# Patient Record
Sex: FEMALE | Race: WHITE
[De-identification: names, ages, dates, MRNs, and addresses within clinical notes are randomized per-mention and may not be internally consistent; named-entity substitution may affect disease eponyms.]

---
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
## 2023-01-31 ENCOUNTER — NURSE TRIAGE (OUTPATIENT)
Dept: OTHER | Facility: CLINIC | Age: 18
End: 2023-01-31

## 2023-01-31 NOTE — TELEPHONE ENCOUNTER
Location of patient: [place]    Subjective: Caller states \"She has a sore throat, cough, fever, congestion. \"     Onset:  1/30/23 and worsened overnight into 1/31/23    Pain Severity:  severe sore throat    Temperature:  she knows she is febrile, but didn't take temp    What has been tried: ibuprofen    Recommended disposition: See HCP within 4 Hours (or PCP triage)    Care advice provided, patient verbalizes understanding; denies any other questions or concerns; instructed to call back for any new or worsening symptoms. 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 Mom called [facility] line. Pt may go to on-campus clinic. Triage RN recommended to [facility]) to call [employer] to learn about covered UCCs in town. Attention Provider: Thank you for allowing me to participate in the care of your patient. The patient was connected to triage in response to symptoms provided. Please do not respond through this encounter as the response is not directed to a shared pool.     Reason for Disposition   SEVERE (e.g., excruciating) throat pain    Protocols used: Sore Throat-ADULT-

## 2024-01-06 ENCOUNTER — HOSPITAL ENCOUNTER (EMERGENCY)
Age: 19
Discharge: HOME | End: 2024-01-06
Payer: COMMERCIAL

## 2024-01-06 VITALS
DIASTOLIC BLOOD PRESSURE: 83 MMHG | SYSTOLIC BLOOD PRESSURE: 110 MMHG | OXYGEN SATURATION: 98 % | RESPIRATION RATE: 18 BRPM | HEART RATE: 98 BPM | WEIGHT: 153 LBS | TEMPERATURE: 98.2 F

## 2024-01-06 DIAGNOSIS — R11.2 NAUSEA AND VOMITING, UNSPECIFIED VOMITING TYPE: Primary | ICD-10-CM

## 2024-01-06 DIAGNOSIS — R19.7 DIARRHEA, UNSPECIFIED TYPE: ICD-10-CM

## 2024-01-06 PROCEDURE — 99211 OFF/OP EST MAY X REQ PHY/QHP: CPT

## 2024-01-06 RX ORDER — ONDANSETRON 4 MG/1
4 TABLET, FILM COATED ORAL EVERY 8 HOURS PRN
Qty: 10 TABLET | Refills: 0 | Status: SHIPPED | OUTPATIENT
Start: 2024-01-06

## 2024-01-06 ASSESSMENT — PAIN - FUNCTIONAL ASSESSMENT: PAIN_FUNCTIONAL_ASSESSMENT: NONE - DENIES PAIN

## 2024-01-06 NOTE — ED PROVIDER NOTES
Department of Emergency Medicine  [facility]  Provider Note  Admit Date/Time: 2024  3:18 PM  Room:   NAME: Christine Calderon  : 2005  MRN: [id number]     Chief Complaint:  Letter for School/Work (NVD, stomach bug is better than last night, needs a note for work)    History of Present Illness        Christine Calderon is a 18 y.o. female who has a past medical history of: History reviewed. No pertinent past medical history. presents to the urgent care center by private car for evaluation.  She said that yesterday she had nausea vomiting and diarrhea could not keep anything down yesterday she said that she is better today still has some nausea that comes and goes but she can take and retain fluids today without difficulty she denies any abdominal pain or back pain denies any respiratory symptoms does not have a sore throat does not have a fever not complaining of body aches chest pain or shortness of breath.  ROS    Pertinent positives and negatives are stated within HPI, all other systems reviewed and are negative.    History reviewed. No pertinent surgical history.Social History:  reports that she has never smoked. She does not have any smokeless tobacco history on file.  Family History: family history is not on file.  Allergies: Patient has no known allergies.    Physical Exam   Oxygen Saturation Interpretation: Normal.   ED Triage Vitals   BP Temp Temp src Pulse Respirations SpO2 Height Weight - Scale   24 1418 24 1418 -- 24 1418 24 1418 24 1418 -- 24 1416   110/83 98.2 °F (36.8 °C)  98 18 98 %  69.4 kg (153 lb)       Physical Exam  Constitutional/General: Alert and oriented x3, well appearing, non toxic in NAD  HEENT:  NC/NT.   Neck: Supple, full ROM,   Respiratory: Lungs clear to auscultation bilaterally, no wheezes, rales, or rhonchi. Not in respiratory distress  CV:  Regular rate. Regular rhythm. No murmurs, gallops, or rubs. 2+

## 2024-01-10 ENCOUNTER — OFFICE VISIT (OUTPATIENT)
Dept: OBGYN | Age: 19
End: 2024-01-10
Payer: COMMERCIAL

## 2024-01-10 VITALS
WEIGHT: 148.9 LBS | DIASTOLIC BLOOD PRESSURE: 76 MMHG | BODY MASS INDEX: 24.81 KG/M2 | HEIGHT: 65 IN | HEART RATE: 58 BPM | SYSTOLIC BLOOD PRESSURE: 132 MMHG

## 2024-01-10 DIAGNOSIS — N94.6 DYSMENORRHEA: Primary | ICD-10-CM

## 2024-01-10 DIAGNOSIS — N92.0 MENORRHAGIA WITH REGULAR CYCLE: ICD-10-CM

## 2024-01-10 PROCEDURE — 99203 OFFICE O/P NEW LOW 30 MIN: CPT | Performed by: OBSTETRICS & GYNECOLOGY

## 2024-01-10 NOTE — PROGRESS NOTES
Patient alert and pleasant with no complaints  Here today for annual GYN visit and to establish care. Mirena paperwork completed and faxed.  Discharge instructions have been discussed with the patient. Patient advised to call our office with any questions or concerns.   Voiced understanding.

## 2024-01-10 NOTE — PROGRESS NOTES
HISTORY OF PRESENT ILLNESS:    Pt presents for follow up for consultation for contraception for cycle control. Currently on menses today.     Patient's last menstrual period was 2024 (exact date).  Periods are: regular  Contraception: condoms  Number of new sexual partners: 0  Most recent pap smear: n/a  History of abnormal pap smears: n/a  Most recent mammogram:   History of abnormal mammogram:   Most recent colonoscopy:   Dexa scan:   HPV vaccination: yes  Changes to health since last visit: n/a  Complaints: would like contraception for cycle control. States first 2 days of her periods are heavy. Pt would like mirena    Past Medical History: No past medical history on file.                                          OB History    Para Term  AB Living   0 0 0 0 0 0   SAB IAB Ectopic Molar Multiple Live Births   0 0 0 0 0 0         Past Surgical History: No past surgical history on file.     Allergies: Patient has no known allergies.     Medications:   No current outpatient medications on file.     No current facility-administered medications for this visit.         Social History:   Social History     Tobacco Use    Smoking status: Never    Smokeless tobacco: Not on file   Substance Use Topics    Alcohol use: Yes     Comment: occasionally        Family History:   No family history on file.    REVIEW OF SYSTEMS:    Constitutional: negative  HEENT: negative  Breast: negative  Respiratory: negative  Cardiovascular: negative  Gastrointestinal: negative  Genitourinary:negative  Integument: negative  Neurological: negative  Endocrine: negative          PHYSICAL EXAM  /76 (Site: Right Upper Arm, Position: Sitting)   Pulse 58   Ht 1.651 m (5' 5\")   Wt 67.5 kg (148 lb 14.4 oz)   LMP 2024 (Exact Date)   BMI 24.78 kg/m²   Patient's last menstrual period was 2024 (exact date).    General appearance: alert, cooperative and in no acute distress.  Head: NCAT   Psych: No acute distress,

## 2024-05-22 ENCOUNTER — PROCEDURE VISIT (OUTPATIENT)
Dept: OBGYN | Age: 19
End: 2024-05-22
Payer: COMMERCIAL

## 2024-05-22 VITALS
RESPIRATION RATE: 16 BRPM | HEIGHT: 65 IN | DIASTOLIC BLOOD PRESSURE: 82 MMHG | HEART RATE: 70 BPM | SYSTOLIC BLOOD PRESSURE: 132 MMHG | WEIGHT: 156 LBS | BODY MASS INDEX: 25.99 KG/M2

## 2024-05-22 DIAGNOSIS — Z20.2 STD EXPOSURE: ICD-10-CM

## 2024-05-22 DIAGNOSIS — N92.0 MENORRHAGIA WITH REGULAR CYCLE: Primary | ICD-10-CM

## 2024-05-22 PROCEDURE — 58300 INSERT INTRAUTERINE DEVICE: CPT | Performed by: OBSTETRICS & GYNECOLOGY

## 2024-05-22 PROCEDURE — 99213 OFFICE O/P EST LOW 20 MIN: CPT | Performed by: OBSTETRICS & GYNECOLOGY

## 2024-05-22 NOTE — PROGRESS NOTES
IUD Insertion Note        Christine Calderon  2024  Patient's last menstrual period was 2024.  /82   Pulse 70   Resp 16   Ht 1.651 m (5' 5\")   Wt 70.8 kg (156 lb)   LMP 2024   BMI 25.96 kg/m²       Pregnancy test was negative.  Procedure r/b/a and IUD side effects discussed.   Questions answered and consent signed.      Procedure note:   The patient is requesting that a Mirena IUD be inserted.     Speculum placed in vagina.  Cervix painted w/ betadine and  grasped w/ a single tooth tenaculum.  Uterus sounded to 8.5 cm.  IUD inserted.  Strings trimmed to 1 in.  She tolerated the procedure well, without complications.      Post procedure instructions were discussed w/ the patient.  She will schedule a string check in 3 months.    Electronically signed by Radha Hercules DO on 24

## 2024-05-23 DIAGNOSIS — Z20.2 STD EXPOSURE: ICD-10-CM

## 2024-05-24 RX ORDER — DOXYCYCLINE HYCLATE 100 MG
100 TABLET ORAL 2 TIMES DAILY
Qty: 14 TABLET | Refills: 0 | Status: SHIPPED | OUTPATIENT
Start: 2024-05-24 | End: 2024-05-31

## 2024-06-21 ENCOUNTER — OFFICE VISIT (OUTPATIENT)
Dept: OBGYN | Age: 19
End: 2024-06-21

## 2024-06-21 VITALS
DIASTOLIC BLOOD PRESSURE: 63 MMHG | BODY MASS INDEX: 25.64 KG/M2 | HEIGHT: 65 IN | WEIGHT: 153.9 LBS | HEART RATE: 96 BPM | SYSTOLIC BLOOD PRESSURE: 126 MMHG

## 2024-06-21 DIAGNOSIS — Z86.19 HX OF CHLAMYDIA INFECTION: Primary | ICD-10-CM

## 2024-06-21 SDOH — ECONOMIC STABILITY: HOUSING INSECURITY
IN THE LAST 12 MONTHS, WAS THERE A TIME WHEN YOU DID NOT HAVE A STEADY PLACE TO SLEEP OR SLEPT IN A SHELTER (INCLUDING NOW)?: NO

## 2024-06-21 SDOH — ECONOMIC STABILITY: FOOD INSECURITY: WITHIN THE PAST 12 MONTHS, YOU WORRIED THAT YOUR FOOD WOULD RUN OUT BEFORE YOU GOT MONEY TO BUY MORE.: NEVER TRUE

## 2024-06-21 SDOH — ECONOMIC STABILITY: FOOD INSECURITY: WITHIN THE PAST 12 MONTHS, THE FOOD YOU BOUGHT JUST DIDN'T LAST AND YOU DIDN'T HAVE MONEY TO GET MORE.: NEVER TRUE

## 2024-06-21 ASSESSMENT — PATIENT HEALTH QUESTIONNAIRE - PHQ9
SUM OF ALL RESPONSES TO PHQ QUESTIONS 1-9: 0
2. FEELING DOWN, DEPRESSED OR HOPELESS: NOT AT ALL
SUM OF ALL RESPONSES TO PHQ QUESTIONS 1-9: 0
1. LITTLE INTEREST OR PLEASURE IN DOING THINGS: NOT AT ALL
SUM OF ALL RESPONSES TO PHQ QUESTIONS 1-9: 0
SUM OF ALL RESPONSES TO PHQ9 QUESTIONS 1 & 2: 0
SUM OF ALL RESPONSES TO PHQ QUESTIONS 1-9: 0

## 2024-06-21 NOTE — PROGRESS NOTES
Christine Calderon (:  2005) is a 18 y.o. female,Established patient, here for evaluation of the following chief complaint(s):  Follow-up (Test of cure)      Assessment & Plan   1. Hx of chlamydia infection  -     MISCELLANEOUS SENDOUT Health Track vaginitis panel  Vaginal culture collected and sent   Will contact with results    Return if symptoms worsen or fail to improve.       Subjective   HPI  Presents for LIV, treated for Chlamydia previously. Partner also treated  Intermittent cramping since IUD placement, otherwise no acute complaints  Denies vaginal, urinary, or bowel complaints.     Review of Systems    Per HPI    Objective   Physical Exam     Vitals:    24 1440   BP: 126/63   Pulse: 96      General Appearance:    Alert, cooperative, no distress, appears stated age  Head:    Normocephalic, without obvious abnormality, atraumatic  Lungs:     Respirations unlabored  Heart:    Regular rate and rhythm  Breast Exam:  Deferred  Abdomen:     Soft, non-tender, no masses, no organomegaly  Pelvic Exam   External: normal external genitalia  Urethra: normal appearing without diverticula or lesions  Vulva: normal appearing vulva with no masses, tenderness or lesions  Vagina: normal vaginal lining without lesion or discharge     An electronic signature was used to authenticate this note.    --Lizbet Smith MD

## 2024-06-21 NOTE — PROGRESS NOTES
Orders Placed This Encounter   Procedures    MISCELLANEOUS SENDOUT Health Track vaginitis panel     Order Specific Question:   Specify Req. Test (1 Test/Order)     Answer:   Health Track vaginitis panel       Specimen obtained labeled and sent to lab

## 2024-06-24 ENCOUNTER — TELEPHONE (OUTPATIENT)
Dept: OBGYN | Age: 19
End: 2024-06-24

## 2024-06-24 DIAGNOSIS — Z86.19 HX OF CHLAMYDIA INFECTION: ICD-10-CM

## 2024-11-27 ENCOUNTER — HOSPITAL ENCOUNTER (EMERGENCY)
Age: 19
Discharge: HOME | End: 2024-11-27
Payer: COMMERCIAL

## 2024-11-27 VITALS
DIASTOLIC BLOOD PRESSURE: 81 MMHG | TEMPERATURE: 99 F | SYSTOLIC BLOOD PRESSURE: 128 MMHG | WEIGHT: 153 LBS | OXYGEN SATURATION: 100 % | HEART RATE: 73 BPM | BODY MASS INDEX: 25.46 KG/M2 | RESPIRATION RATE: 18 BRPM

## 2024-11-27 DIAGNOSIS — J20.9 ACUTE BRONCHITIS, UNSPECIFIED ORGANISM: Primary | ICD-10-CM

## 2024-11-27 LAB
FLUAV RNA RESP QL NAA+PROBE: NOT DETECTED
FLUBV RNA RESP QL NAA+PROBE: NOT DETECTED
HETEROPH AB BLD QL IA: NEGATIVE
SARS-COV-2 RNA RESP QL NAA+PROBE: NOT DETECTED
SOURCE: NORMAL
SPECIMEN DESCRIPTION: NORMAL
SPECIMEN SOURCE: NORMAL
STREP A, MOLECULAR: NEGATIVE

## 2024-11-27 PROCEDURE — 99211 OFF/OP EST MAY X REQ PHY/QHP: CPT

## 2024-11-27 PROCEDURE — 86308 HETEROPHILE ANTIBODY SCREEN: CPT

## 2024-11-27 PROCEDURE — 87651 STREP A DNA AMP PROBE: CPT

## 2024-11-27 PROCEDURE — 87636 SARSCOV2 & INF A&B AMP PRB: CPT

## 2024-11-27 PROCEDURE — 36415 COLL VENOUS BLD VENIPUNCTURE: CPT

## 2024-11-27 RX ORDER — AZITHROMYCIN 250 MG/1
TABLET, FILM COATED ORAL
Qty: 1 PACKET | Refills: 0 | Status: SHIPPED | OUTPATIENT
Start: 2024-11-27 | End: 2024-12-07

## 2024-11-27 ASSESSMENT — PAIN - FUNCTIONAL ASSESSMENT: PAIN_FUNCTIONAL_ASSESSMENT: NONE - DENIES PAIN

## 2024-11-27 NOTE — ED PROVIDER NOTES
symptoms.  She denies being pregnant or breast-feeding.  She does admit to vaping at times.  Has been having a cough.  Lungs are clear to auscultation O2 sat 100%.  States over-the-counter cough and cold medications have been helping.  COVID flu strep and monotest were all negative.  Placed on Zithromax antibiotic.  Instructions given.         DISCHARGE MEDICATIONS:  New Prescriptions    AZITHROMYCIN (ZITHROMAX Z-LISSET) 250 MG TABLET    Take 2 tabs on day one, followed by 1 tablet daily for 4 days.       DISCONTINUED MEDICATIONS:  Discontinued Medications    No medications on file       PATIENT REFERRED TO:  your primary care provider    Schedule an appointment as soon as possible for a visit         --------------------------------- ADDITIONAL PROVIDER NOTES ---------------------------------  I have spoken with the patient and discussed today’s results, in addition to providing specific details for the plan of care and counseling regarding the diagnosis and prognosis.  Their questions are answered at this time and they are agreeable with the plan.   This patient is stable for discharge.  I have shared the specific conditions for return, as well as the importance of follow-up.      * NOTE: (Please note that portions of this note were completed with a voice recognition program.  Efforts were made to edit the dictations but occasionally words are mis-transcribed.)    --------------------------------- IMPRESSION AND DISPOSITION ---------------------------------    IMPRESSION  1. Acute bronchitis, unspecified organism        Disposition: Discharge to home  Patient condition is good    I am the Primary Clinician of Record.     Anibal Rae PA-C (electronically signed) on 11/27/2024 at 4:12 PM         Anibal Rae PA-C  11/27/24 1822

## 2024-12-10 ENCOUNTER — TELEPHONE (OUTPATIENT)
Dept: OBGYN | Age: 19
End: 2024-12-10

## 2024-12-10 NOTE — TELEPHONE ENCOUNTER
Patient called and said she is urinating blood. She said last night she was having trouble urinating but kept feeling like she had to use the bathroom. Advised patient to go to ER or Urgent care to be evaluated.

## 2025-04-24 ENCOUNTER — OFFICE VISIT (OUTPATIENT)
Dept: OBGYN | Age: 20
End: 2025-04-24
Payer: COMMERCIAL

## 2025-04-24 VITALS
HEART RATE: 77 BPM | BODY MASS INDEX: 25.56 KG/M2 | TEMPERATURE: 97.1 F | OXYGEN SATURATION: 99 % | WEIGHT: 153.4 LBS | RESPIRATION RATE: 18 BRPM | DIASTOLIC BLOOD PRESSURE: 74 MMHG | SYSTOLIC BLOOD PRESSURE: 120 MMHG | HEIGHT: 65 IN

## 2025-04-24 DIAGNOSIS — Z97.5 IUD (INTRAUTERINE DEVICE) IN PLACE: ICD-10-CM

## 2025-04-24 DIAGNOSIS — Z01.419 ENCOUNTER FOR GYNECOLOGICAL EXAMINATION: Primary | ICD-10-CM

## 2025-04-24 DIAGNOSIS — N94.10 DYSPAREUNIA IN FEMALE: ICD-10-CM

## 2025-04-24 PROCEDURE — 99385 PREV VISIT NEW AGE 18-39: CPT | Performed by: OBSTETRICS & GYNECOLOGY

## 2025-04-24 PROCEDURE — 99213 OFFICE O/P EST LOW 20 MIN: CPT | Performed by: OBSTETRICS & GYNECOLOGY

## 2025-04-24 SDOH — ECONOMIC STABILITY: TRANSPORTATION INSECURITY
IN THE PAST 12 MONTHS, HAS LACK OF TRANSPORTATION KEPT YOU FROM MEETINGS, WORK, OR FROM GETTING THINGS NEEDED FOR DAILY LIVING?: NO

## 2025-04-24 SDOH — ECONOMIC STABILITY: TRANSPORTATION INSECURITY
IN THE PAST 12 MONTHS, HAS THE LACK OF TRANSPORTATION KEPT YOU FROM MEDICAL APPOINTMENTS OR FROM GETTING MEDICATIONS?: NO

## 2025-04-24 SDOH — ECONOMIC STABILITY: FOOD INSECURITY: WITHIN THE PAST 12 MONTHS, YOU WORRIED THAT YOUR FOOD WOULD RUN OUT BEFORE YOU GOT MONEY TO BUY MORE.: NEVER TRUE

## 2025-04-24 SDOH — ECONOMIC STABILITY: INCOME INSECURITY: IN THE LAST 12 MONTHS, WAS THERE A TIME WHEN YOU WERE NOT ABLE TO PAY THE MORTGAGE OR RENT ON TIME?: NO

## 2025-04-24 SDOH — ECONOMIC STABILITY: FOOD INSECURITY: WITHIN THE PAST 12 MONTHS, THE FOOD YOU BOUGHT JUST DIDN'T LAST AND YOU DIDN'T HAVE MONEY TO GET MORE.: NEVER TRUE

## 2025-04-24 ASSESSMENT — PATIENT HEALTH QUESTIONNAIRE - PHQ9
1. LITTLE INTEREST OR PLEASURE IN DOING THINGS: NOT AT ALL
SUM OF ALL RESPONSES TO PHQ QUESTIONS 1-9: 0
1. LITTLE INTEREST OR PLEASURE IN DOING THINGS: NOT AT ALL
SUM OF ALL RESPONSES TO PHQ QUESTIONS 1-9: 0
2. FEELING DOWN, DEPRESSED OR HOPELESS: NOT AT ALL
SUM OF ALL RESPONSES TO PHQ9 QUESTIONS 1 & 2: 0
2. FEELING DOWN, DEPRESSED OR HOPELESS: NOT AT ALL

## 2025-04-24 NOTE — PROGRESS NOTES
HISTORY OF PRESENT ILLNESS:    19 y.o. female   presents for her annual exam.     No LMP recorded. (Menstrual status: IUD).  Periods are: n/a IUD  Contraception: condoms  Number of new sexual partners: 0  Most recent pap smear: n/a  History of abnormal pap smears: n/a  Most recent mammogram:   History of abnormal mammogram:   Most recent colonoscopy:   Dexa scan:   HPV vaccination: yes  Changes to health since last visit: n/a  Complaints: IUD placed last year. Some pain with intercourse only. Not all the time.    Past Medical History: History reviewed. No pertinent past medical history.                                          OB History    Para Term  AB Living   0 0 0 0 0 0   SAB IAB Ectopic Molar Multiple Live Births   0 0 0 0 0 0          Past Surgical History: No past surgical history on file.     Allergies: Patient has no known allergies.     Medications:   Current Outpatient Medications   Medication Sig Dispense Refill    IUD'S IU by IntraUTERine route       No current facility-administered medications for this visit.         Social History:   Social History     Tobacco Use    Smoking status: Never    Smokeless tobacco: Never   Substance Use Topics    Alcohol use: Yes     Comment: occasionally        Family History:   No family history on file.    REVIEW OF SYSTEMS:    Constitutional: negative  HEENT: negative  Breast: negative  Respiratory: negative  Cardiovascular: negative  Gastrointestinal: negative  Genitourinary:negative  Integument: negative  Neurological: negative  Endocrine: negative          PHYSICAL EXAM  /74   Pulse 77   Temp 97.1 °F (36.2 °C)   Resp 18   Ht 1.651 m (5' 5\")   Wt 69.6 kg (153 lb 6.4 oz)   SpO2 99%   BMI 25.53 kg/m²       General appearance: alert, cooperative and in no acute distress.  Head: NCAT   Neck: Neck supple, no mass  Breasts: nontender, no masses palpable bilaterally, no dimpling, no discharge  Lungs: clear to auscultation

## 2025-04-24 NOTE — PROGRESS NOTES
Pt here today for IUD check  Pt is having pain during intercourse  Would like to have STI check today  No other concerns or complaints

## 2025-04-30 LAB
CHLAMYDIA BY THIN PREP: NEGATIVE
N. GONORRHOEAE DNA, THIN PREP: NEGATIVE

## 2025-05-01 LAB — GYNECOLOGY CYTOLOGY REPORT: NORMAL

## 2025-07-07 ENCOUNTER — HOSPITAL ENCOUNTER (OUTPATIENT)
Dept: ULTRASOUND IMAGING | Age: 20
Discharge: HOME | End: 2025-07-09
Attending: OBSTETRICS & GYNECOLOGY
Payer: COMMERCIAL

## 2025-07-07 DIAGNOSIS — N94.10 DYSPAREUNIA IN FEMALE: ICD-10-CM

## 2025-07-07 PROCEDURE — 76830 TRANSVAGINAL US NON-OB: CPT
